# Patient Record
Sex: FEMALE | URBAN - METROPOLITAN AREA
[De-identification: names, ages, dates, MRNs, and addresses within clinical notes are randomized per-mention and may not be internally consistent; named-entity substitution may affect disease eponyms.]

---

## 2018-09-05 ENCOUNTER — APPOINTMENT (RX ONLY)
Dept: URBAN - METROPOLITAN AREA CLINIC 149 | Facility: CLINIC | Age: 53
Setting detail: DERMATOLOGY
End: 2018-09-05

## 2018-09-05 DIAGNOSIS — L30.9 DERMATITIS, UNSPECIFIED: ICD-10-CM

## 2018-09-05 PROCEDURE — ? PRESCRIPTION

## 2018-09-05 PROCEDURE — ? COUNSELING

## 2018-09-05 PROCEDURE — 99202 OFFICE O/P NEW SF 15 MIN: CPT

## 2018-09-05 PROCEDURE — ? ADDITIONAL NOTES

## 2018-09-05 RX ORDER — TRIAMCINOLONE ACETONIDE 1 MG/G
CREAM TOPICAL BID
Qty: 1 | Refills: 0 | Status: ERX | COMMUNITY
Start: 2018-09-05

## 2018-09-05 RX ORDER — HYDROCORTISONE 25 MG/G
CREAM TOPICAL
Qty: 1 | Refills: 0 | Status: ERX | COMMUNITY
Start: 2018-09-05

## 2018-09-05 RX ADMIN — HYDROCORTISONE: 25 CREAM TOPICAL at 20:39

## 2018-09-05 RX ADMIN — TRIAMCINOLONE ACETONIDE: 1 CREAM TOPICAL at 20:38

## 2018-09-05 ASSESSMENT — LOCATION DETAILED DESCRIPTION DERM
LOCATION DETAILED: LEFT MID-UPPER BACK
LOCATION DETAILED: PERIUMBILICAL SKIN

## 2018-09-05 ASSESSMENT — LOCATION SIMPLE DESCRIPTION DERM
LOCATION SIMPLE: ABDOMEN
LOCATION SIMPLE: LEFT UPPER BACK

## 2018-09-05 ASSESSMENT — LOCATION ZONE DERM: LOCATION ZONE: TRUNK

## 2018-09-27 ENCOUNTER — APPOINTMENT (RX ONLY)
Dept: URBAN - METROPOLITAN AREA CLINIC 149 | Facility: CLINIC | Age: 53
Setting detail: DERMATOLOGY
End: 2018-09-27

## 2018-09-27 DIAGNOSIS — L30.9 DERMATITIS, UNSPECIFIED: ICD-10-CM | Status: RESOLVING

## 2018-09-27 PROCEDURE — ? COUNSELING

## 2018-09-27 PROCEDURE — 99213 OFFICE O/P EST LOW 20 MIN: CPT

## 2018-09-27 PROCEDURE — ? TREATMENT REGIMEN

## 2018-09-27 ASSESSMENT — LOCATION SIMPLE DESCRIPTION DERM: LOCATION SIMPLE: ABDOMEN

## 2018-09-27 ASSESSMENT — LOCATION DETAILED DESCRIPTION DERM: LOCATION DETAILED: EPIGASTRIC SKIN

## 2018-09-27 ASSESSMENT — LOCATION ZONE DERM: LOCATION ZONE: TRUNK

## 2022-07-09 ENCOUNTER — TELEPHONE ENCOUNTER (OUTPATIENT)
Dept: URBAN - METROPOLITAN AREA CLINIC 121 | Facility: CLINIC | Age: 57
End: 2022-07-09

## 2022-07-10 ENCOUNTER — TELEPHONE ENCOUNTER (OUTPATIENT)
Dept: URBAN - METROPOLITAN AREA CLINIC 121 | Facility: CLINIC | Age: 57
End: 2022-07-10

## 2022-07-10 RX ORDER — DM/PE/ACETAMINOPHEN/CHLORPHENR 10-5-325-2
TABLET, SEQUENTIAL ORAL
Refills: 0 | Status: ACTIVE | COMMUNITY
Start: 2013-06-26

## 2022-07-10 RX ORDER — ASPIRIN 81 MG/1
TABLET, COATED ORAL AS NEEDED
Refills: 0 | Status: ACTIVE | COMMUNITY
Start: 2013-06-26

## 2022-07-10 RX ORDER — ESOMEPRAZOLE MAGNESIUM 40 MG
CAPSULE,DELAYED RELEASE (ENTERIC COATED) ORAL AS NEEDED
Refills: 0 | Status: ACTIVE | COMMUNITY
Start: 2013-06-26

## 2022-07-10 RX ORDER — SUCRALFATE 1 G/1
TABLET ORAL
Refills: 0 | Status: ACTIVE | COMMUNITY
Start: 2013-11-20

## 2022-07-10 RX ORDER — ACETAMINOPHEN, ASPIRIN (NSAID) AND CAFFEINE 250; 250; 65 MG/1; MG/1; MG/1
TABLET, FILM COATED ORAL AS NEEDED
Refills: 0 | Status: ACTIVE | COMMUNITY
Start: 2013-06-26

## 2022-07-10 RX ORDER — DICLOFENAC POTASSIUM 50 MG/1
TABLET, COATED ORAL AS NEEDED
Refills: 0 | Status: ACTIVE | COMMUNITY
Start: 2013-06-26

## 2024-04-19 ENCOUNTER — OV NP (OUTPATIENT)
Dept: URBAN - METROPOLITAN AREA CLINIC 60 | Facility: CLINIC | Age: 59
End: 2024-04-19
Payer: COMMERCIAL

## 2024-04-19 VITALS
HEIGHT: 62 IN | DIASTOLIC BLOOD PRESSURE: 70 MMHG | TEMPERATURE: 97.8 F | HEART RATE: 83 BPM | SYSTOLIC BLOOD PRESSURE: 120 MMHG | WEIGHT: 137 LBS | BODY MASS INDEX: 25.21 KG/M2 | RESPIRATION RATE: 20 BRPM | OXYGEN SATURATION: 98 %

## 2024-04-19 DIAGNOSIS — K29.60 REFLUX GASTRITIS: ICD-10-CM

## 2024-04-19 DIAGNOSIS — Z86.010 PERSONAL HISTORY OF COLONIC POLYPS: ICD-10-CM

## 2024-04-19 PROCEDURE — 99204 OFFICE O/P NEW MOD 45 MIN: CPT | Performed by: NURSE PRACTITIONER

## 2024-04-19 RX ORDER — OMEPRAZOLE 20 MG/1
1 CAPSULE 30 MINUTES BEFORE MORNING MEAL CAPSULE, DELAYED RELEASE ORAL ONCE A DAY
Qty: 30 | Refills: 2 | OUTPATIENT
Start: 2024-04-19

## 2024-04-19 RX ORDER — MELOXICAM 7.5 MG
1 TABLET TABLET ORAL ONCE A DAY
Status: ACTIVE | COMMUNITY

## 2024-04-19 RX ORDER — ESOMEPRAZOLE MAGNESIUM 40 MG
CAPSULE,DELAYED RELEASE (ENTERIC COATED) ORAL AS NEEDED
Refills: 0 | Status: ACTIVE | COMMUNITY
Start: 2013-06-26

## 2024-04-19 RX ORDER — DM/PE/ACETAMINOPHEN/CHLORPHENR 10-5-325-2
TABLET, SEQUENTIAL ORAL
Refills: 0 | Status: ACTIVE | COMMUNITY
Start: 2013-06-26

## 2024-04-19 RX ORDER — ACETAMINOPHEN, ASPIRIN (NSAID) AND CAFFEINE 250; 250; 65 MG/1; MG/1; MG/1
TABLET, FILM COATED ORAL AS NEEDED
Refills: 0 | Status: ACTIVE | COMMUNITY
Start: 2013-06-26

## 2024-04-19 RX ORDER — ASPIRIN 81 MG/1
TABLET, COATED ORAL AS NEEDED
Refills: 0 | Status: ACTIVE | COMMUNITY
Start: 2013-06-26

## 2024-04-19 NOTE — PHYSICAL EXAM CHEST:
No lesions,  no deformities, chest wall non-tender,  no masses present, breathing is unlabored without, normal breath sounds.  English

## 2024-04-19 NOTE — HPI-TODAY'S VISIT:
4/24 Patient is here today complaining having symptom of gastritis and reflux.  Her symptoms are chronic but now seems to be worse.  Also, here for her surveillance colonoscopy her last colonoscopy was done more than 10 years ago.  She denies any rectal bleeding, melena, change in her bowel habits, personal or family history of colorectal cancer.

## 2024-04-26 ENCOUNTER — LAB (OUTPATIENT)
Dept: URBAN - METROPOLITAN AREA CLINIC 60 | Facility: CLINIC | Age: 59
End: 2024-04-26

## 2024-05-02 ENCOUNTER — OFFICE VISIT (OUTPATIENT)
Dept: URBAN - METROPOLITAN AREA SURGERY CENTER 4 | Facility: SURGERY CENTER | Age: 59
End: 2024-05-02
Payer: COMMERCIAL

## 2024-05-02 DIAGNOSIS — K64.1 SECOND DEGREE HEMORRHOIDS: ICD-10-CM

## 2024-05-02 DIAGNOSIS — K29.70 GASTRITIS: ICD-10-CM

## 2024-05-02 DIAGNOSIS — K20.90 ESOPHAGITIS, UNSPECIFIED WITHOUT BLEEDING: ICD-10-CM

## 2024-05-02 DIAGNOSIS — Z12.11 ENCOUNTER FOR SCREENING FOR MALIGNANT NEOPLASM OF COLON: ICD-10-CM

## 2024-05-02 DIAGNOSIS — D17.9 LIPOMA: ICD-10-CM

## 2024-05-02 DIAGNOSIS — Z12.11 COLON CANCER SCREENING (HIGH RISK): ICD-10-CM

## 2024-05-02 DIAGNOSIS — K44.9 DIAPHRAGMATIC HERNIA WITHOUT OBSTRUCTION OR GANGRENE: ICD-10-CM

## 2024-05-02 DIAGNOSIS — K29.50 CHRONIC GASTRITIS WITHOUT BLEEDING, UNSPECIFIED GASTRITIS TYPE: ICD-10-CM

## 2024-05-02 DIAGNOSIS — K21.9 ESOPHAGEAL REFLUX: ICD-10-CM

## 2024-05-02 PROCEDURE — 45380 COLONOSCOPY AND BIOPSY: CPT | Performed by: INTERNAL MEDICINE

## 2024-05-02 PROCEDURE — 43239 EGD BIOPSY SINGLE/MULTIPLE: CPT | Performed by: INTERNAL MEDICINE

## 2024-05-02 PROCEDURE — 00813 ANES UPR LWR GI NDSC PX: CPT | Performed by: NURSE ANESTHETIST, CERTIFIED REGISTERED

## 2024-05-02 RX ORDER — ASPIRIN 81 MG/1
TABLET, COATED ORAL AS NEEDED
Refills: 0 | Status: ACTIVE | COMMUNITY
Start: 2013-06-26

## 2024-05-02 RX ORDER — ESOMEPRAZOLE MAGNESIUM 40 MG
CAPSULE,DELAYED RELEASE (ENTERIC COATED) ORAL AS NEEDED
Refills: 0 | Status: ACTIVE | COMMUNITY
Start: 2013-06-26

## 2024-05-02 RX ORDER — MELOXICAM 7.5 MG
1 TABLET TABLET ORAL ONCE A DAY
Status: ACTIVE | COMMUNITY

## 2024-05-02 RX ORDER — DM/PE/ACETAMINOPHEN/CHLORPHENR 10-5-325-2
TABLET, SEQUENTIAL ORAL
Refills: 0 | Status: ACTIVE | COMMUNITY
Start: 2013-06-26

## 2024-05-02 RX ORDER — ACETAMINOPHEN, ASPIRIN (NSAID) AND CAFFEINE 250; 250; 65 MG/1; MG/1; MG/1
TABLET, FILM COATED ORAL AS NEEDED
Refills: 0 | Status: ACTIVE | COMMUNITY
Start: 2013-06-26

## 2024-05-02 RX ORDER — OMEPRAZOLE 20 MG/1
1 CAPSULE 30 MINUTES BEFORE MORNING MEAL CAPSULE, DELAYED RELEASE ORAL ONCE A DAY
Qty: 30 | Refills: 2 | Status: ACTIVE | COMMUNITY
Start: 2024-04-19

## 2024-06-21 ENCOUNTER — OFFICE VISIT (OUTPATIENT)
Dept: URBAN - METROPOLITAN AREA CLINIC 60 | Facility: CLINIC | Age: 59
End: 2024-06-21

## 2024-08-14 ENCOUNTER — OFFICE VISIT (OUTPATIENT)
Dept: URBAN - METROPOLITAN AREA CLINIC 60 | Facility: CLINIC | Age: 59
End: 2024-08-14
Payer: COMMERCIAL

## 2024-08-14 ENCOUNTER — DASHBOARD ENCOUNTERS (OUTPATIENT)
Age: 59
End: 2024-08-14

## 2024-08-14 ENCOUNTER — LAB OUTSIDE AN ENCOUNTER (OUTPATIENT)
Dept: URBAN - METROPOLITAN AREA CLINIC 60 | Facility: CLINIC | Age: 59
End: 2024-08-14

## 2024-08-14 VITALS
OXYGEN SATURATION: 98 % | BODY MASS INDEX: 25.21 KG/M2 | TEMPERATURE: 97.8 F | HEART RATE: 62 BPM | HEIGHT: 62 IN | DIASTOLIC BLOOD PRESSURE: 90 MMHG | WEIGHT: 137 LBS | SYSTOLIC BLOOD PRESSURE: 160 MMHG

## 2024-08-14 DIAGNOSIS — N85.9 LESION OF UTERUS: ICD-10-CM

## 2024-08-14 DIAGNOSIS — K29.60 REFLUX GASTRITIS: ICD-10-CM

## 2024-08-14 DIAGNOSIS — Z86.010 PERSONAL HISTORY OF COLONIC POLYPS: ICD-10-CM

## 2024-08-14 PROCEDURE — 99214 OFFICE O/P EST MOD 30 MIN: CPT | Performed by: NURSE PRACTITIONER

## 2024-08-14 RX ORDER — MELOXICAM 7.5 MG
1 TABLET TABLET ORAL ONCE A DAY
Status: ACTIVE | COMMUNITY

## 2024-08-14 RX ORDER — DM/PE/ACETAMINOPHEN/CHLORPHENR 10-5-325-2
TABLET, SEQUENTIAL ORAL
Refills: 0 | Status: ACTIVE | COMMUNITY
Start: 2013-06-26

## 2024-08-14 RX ORDER — ESOMEPRAZOLE MAGNESIUM 40 MG
CAPSULE,DELAYED RELEASE (ENTERIC COATED) ORAL AS NEEDED
Refills: 0 | Status: DISCONTINUED | COMMUNITY
Start: 2013-06-26

## 2024-08-14 RX ORDER — OMEPRAZOLE 20 MG/1
1 CAPSULE 30 MINUTES BEFORE MORNING MEAL CAPSULE, DELAYED RELEASE ORAL ONCE A DAY
Qty: 30 | Refills: 2 | Status: ACTIVE | COMMUNITY
Start: 2024-04-19

## 2024-08-14 RX ORDER — OMEPRAZOLE 20 MG/1
1 CAPSULE 30 MINUTES BEFORE MORNING MEAL CAPSULE, DELAYED RELEASE ORAL ONCE A DAY
Qty: 90 CAPSULES | Refills: 0 | OUTPATIENT

## 2024-08-14 RX ORDER — SUCRALFATE 1 G/1
1 TABLET ON AN EMPTY STOMACH TABLET ORAL TWICE A DAY
Qty: 180 TABLET | Refills: 0 | OUTPATIENT
Start: 2024-08-14 | End: 2024-11-11

## 2024-08-14 RX ORDER — ACETAMINOPHEN, ASPIRIN (NSAID) AND CAFFEINE 250; 250; 65 MG/1; MG/1; MG/1
TABLET, FILM COATED ORAL AS NEEDED
Refills: 0 | Status: DISCONTINUED | COMMUNITY
Start: 2013-06-26

## 2024-08-14 RX ORDER — DIPHENHYDRAMINE HCL 2 %
1 CAPSULE AT BEDTIME AS NEEDED CREAM (GRAM) TOPICAL ONCE A DAY
Status: ACTIVE | COMMUNITY

## 2024-08-14 RX ORDER — ASPIRIN 81 MG/1
TABLET, COATED ORAL AS NEEDED
Refills: 0 | Status: ACTIVE | COMMUNITY
Start: 2013-06-26

## 2024-08-14 NOTE — HPI-TODAY'S VISIT:
4/24 Patient is here today complaining having symptom of gastritis and reflux.  Her symptoms are chronic but now seems to be worse.  Also, here for her surveillance colonoscopy her last colonoscopy was done more than 10 years ago.  She denies any rectal bleeding, melena, change in her bowel habits, personal or family history of colorectal cancer. 8/24 Patient EGD shows evidence of LA grade a reflux esophagitis, small hiatal hernia, chronic gastritis, normal examined duodenum.  Colonoscopy shows evidence of internal hemorrhoids medium size   12 mm lipoma in the rectum.  There is also an extrinsic compression in the rectum at the anterior border of 10 cm, possible uterine retroversion or fibroid. Biopsy results show evidence of duodenum  without specific histologic findings.  Gastric antrum/body biopsy with focal minimal chronic gastritis, negative for H. pylori organisms.  Lower third esophageal mucosa squamocolumnar mucosa with mild reflux type changes and chronic inflammation.  Negative for intestinal metaplasia.  Rectum biopsy with mild edematous rectal mucosa. Patient will have transvaginal ultrasound and treatment for GERD

## 2024-09-25 ENCOUNTER — OFFICE VISIT (OUTPATIENT)
Dept: URBAN - METROPOLITAN AREA CLINIC 60 | Facility: CLINIC | Age: 59
End: 2024-09-25

## 2024-09-25 RX ORDER — SUCRALFATE 1 G/1
1 TABLET ON AN EMPTY STOMACH TABLET ORAL TWICE A DAY
Qty: 180 TABLET | Refills: 0 | Status: ACTIVE | COMMUNITY
Start: 2024-08-14 | End: 2024-11-11

## 2024-09-25 RX ORDER — OMEPRAZOLE 20 MG/1
1 CAPSULE 30 MINUTES BEFORE MORNING MEAL CAPSULE, DELAYED RELEASE ORAL ONCE A DAY
Qty: 90 CAPSULES | Refills: 0 | Status: ACTIVE | COMMUNITY

## 2024-09-25 RX ORDER — ASPIRIN 81 MG/1
TABLET, COATED ORAL AS NEEDED
Refills: 0 | Status: ACTIVE | COMMUNITY
Start: 2013-06-26

## 2024-09-25 RX ORDER — MELOXICAM 7.5 MG
1 TABLET TABLET ORAL ONCE A DAY
Status: ACTIVE | COMMUNITY

## 2024-09-25 RX ORDER — DM/PE/ACETAMINOPHEN/CHLORPHENR 10-5-325-2
TABLET, SEQUENTIAL ORAL
Refills: 0 | Status: ACTIVE | COMMUNITY
Start: 2013-06-26

## 2024-09-25 RX ORDER — DIPHENHYDRAMINE HCL 2 %
1 CAPSULE AT BEDTIME AS NEEDED CREAM (GRAM) TOPICAL ONCE A DAY
Status: ACTIVE | COMMUNITY

## 2024-09-27 ENCOUNTER — LAB OUTSIDE AN ENCOUNTER (OUTPATIENT)
Dept: URBAN - METROPOLITAN AREA CLINIC 63 | Facility: CLINIC | Age: 59
End: 2024-09-27

## 2024-09-28 LAB
A/G RATIO: 1.4
ABSOLUTE BASOPHILS: 48
ABSOLUTE EOSINOPHILS: 82
ABSOLUTE LYMPHOCYTES: 1340
ABSOLUTE MONOCYTES: 510
ABSOLUTE NEUTROPHILS: 4821
ALBUMIN: 4.5
ALKALINE PHOSPHATASE: 72
ALT (SGPT): 25
AST (SGOT): 20
BASOPHILS: 0.7
BILIRUBIN, TOTAL: 0.4
BUN/CREATININE RATIO: (no result)
BUN: 12
CALCIUM: 10.3
CARBON DIOXIDE, TOTAL: 28
CHLORIDE: 105
CREATININE: 0.61
EGFR: 102
EOSINOPHILS: 1.2
GLOBULIN, TOTAL: 3.2
GLUCOSE: 90
HEMATOCRIT: 40.8
HEMOGLOBIN: 13.6
LYMPHOCYTES: 19.7
MCH: 30.1
MCHC: 33.3
MCV: 90.3
MONOCYTES: 7.5
MPV: 10.1
NEUTROPHILS: 70.9
PLATELET COUNT: 269
POTASSIUM: 4.5
PROTEIN, TOTAL: 7.7
RDW: 11.8
RED BLOOD CELL COUNT: 4.52
SODIUM: 139
WHITE BLOOD CELL COUNT: 6.8

## 2024-09-30 ENCOUNTER — OFFICE VISIT (OUTPATIENT)
Dept: URBAN - METROPOLITAN AREA CLINIC 60 | Facility: CLINIC | Age: 59
End: 2024-09-30
Payer: COMMERCIAL

## 2024-09-30 VITALS
HEIGHT: 62 IN | SYSTOLIC BLOOD PRESSURE: 120 MMHG | HEART RATE: 72 BPM | OXYGEN SATURATION: 96 % | RESPIRATION RATE: 20 BRPM | WEIGHT: 140 LBS | BODY MASS INDEX: 25.76 KG/M2 | TEMPERATURE: 98.2 F | DIASTOLIC BLOOD PRESSURE: 80 MMHG

## 2024-09-30 DIAGNOSIS — N85.9 LESION OF UTERUS: ICD-10-CM

## 2024-09-30 DIAGNOSIS — K29.60 REFLUX GASTRITIS: ICD-10-CM

## 2024-09-30 DIAGNOSIS — Z86.010 PERSONAL HISTORY OF COLONIC POLYPS: ICD-10-CM

## 2024-09-30 PROCEDURE — 99214 OFFICE O/P EST MOD 30 MIN: CPT | Performed by: NURSE PRACTITIONER

## 2024-09-30 RX ORDER — MELOXICAM 7.5 MG
1 TABLET TABLET ORAL ONCE A DAY
Status: ACTIVE | COMMUNITY

## 2024-09-30 RX ORDER — OMEPRAZOLE 20 MG/1
1 CAPSULE 30 MINUTES BEFORE MORNING MEAL CAPSULE, DELAYED RELEASE ORAL ONCE A DAY
Qty: 90 CAPSULES | Refills: 0 | OUTPATIENT

## 2024-09-30 RX ORDER — DM/PE/ACETAMINOPHEN/CHLORPHENR 10-5-325-2
TABLET, SEQUENTIAL ORAL
Refills: 0 | Status: ACTIVE | COMMUNITY
Start: 2013-06-26

## 2024-09-30 RX ORDER — SUCRALFATE 1 G/1
1 TABLET ON AN EMPTY STOMACH TABLET ORAL TWICE A DAY
Qty: 180 TABLET | Refills: 0 | Status: ACTIVE | COMMUNITY
Start: 2024-08-14 | End: 2024-11-11

## 2024-09-30 RX ORDER — OMEPRAZOLE 20 MG/1
1 CAPSULE 30 MINUTES BEFORE MORNING MEAL CAPSULE, DELAYED RELEASE ORAL ONCE A DAY
Qty: 90 CAPSULES | Refills: 0 | Status: ACTIVE | COMMUNITY

## 2024-09-30 RX ORDER — ASPIRIN 81 MG/1
TABLET, COATED ORAL AS NEEDED
Refills: 0 | Status: ACTIVE | COMMUNITY
Start: 2013-06-26

## 2024-09-30 NOTE — HPI-TODAY'S VISIT:
4/24 Patient is here today complaining having symptom of gastritis and reflux.  Her symptoms are chronic but now seems to be worse.  Also, here for her surveillance colonoscopy her last colonoscopy was done more than 10 years ago.  She denies any rectal bleeding, melena, change in her bowel habits, personal or family history of colorectal cancer. 8/24 Patient EGD shows evidence of LA grade a reflux esophagitis, small hiatal hernia, chronic gastritis, normal examined duodenum.  Colonoscopy shows evidence of internal hemorrhoids medium size   12 mm lipoma in the rectum.  There is also an extrinsic compression in the rectum at the anterior border of 10 cm, possible uterine retroversion or fibroid. Biopsy results show evidence of duodenum  without specific histologic findings.  Gastric antrum/body biopsy with focal minimal chronic gastritis, negative for H. pylori organisms.  Lower third esophageal mucosa squamocolumnar mucosa with mild reflux type changes and chronic inflammation.  Negative for intestinal metaplasia.  Rectum biopsy with mild edematous rectal mucosa. Patient will have transvaginal ultrasound and treatment for GERD  9/24 Patient laboratories' amylase, lipase, CMP and CBC were normal. Transvaginal ultrasound shows evidence of leiomyomatous uterus with multiple uterine fibroids and myometrial calcification.  Probably hyperplasia of the endometrial canal.  Very early mass lesion cannot be excluded.  Correlation with MRI of the pancreas soft tissue with IV contrast is recommended.  I am going to refer her with her gynecologist for appropriate follow-up and further studies of his gynecologist  System. Today she is in good general states she has no new GI complaints. Her symptom of gastritis and reflux are better patient will continue with omeprazole 20 mg for 4 weeks will DC Carafate.